# Patient Record
Sex: MALE | Race: WHITE
[De-identification: names, ages, dates, MRNs, and addresses within clinical notes are randomized per-mention and may not be internally consistent; named-entity substitution may affect disease eponyms.]

---

## 2019-08-15 ENCOUNTER — HOSPITAL ENCOUNTER (INPATIENT)
Dept: HOSPITAL 61 - ER | Age: 54
LOS: 2 days | Discharge: HOME | DRG: 246 | End: 2019-08-17
Attending: INTERNAL MEDICINE | Admitting: INTERNAL MEDICINE
Payer: OTHER GOVERNMENT

## 2019-08-15 VITALS — SYSTOLIC BLOOD PRESSURE: 135 MMHG | DIASTOLIC BLOOD PRESSURE: 86 MMHG

## 2019-08-15 VITALS — BODY MASS INDEX: 33.86 KG/M2 | WEIGHT: 250 LBS | HEIGHT: 72 IN

## 2019-08-15 VITALS — DIASTOLIC BLOOD PRESSURE: 63 MMHG | SYSTOLIC BLOOD PRESSURE: 118 MMHG

## 2019-08-15 VITALS — DIASTOLIC BLOOD PRESSURE: 103 MMHG | SYSTOLIC BLOOD PRESSURE: 186 MMHG

## 2019-08-15 VITALS — SYSTOLIC BLOOD PRESSURE: 190 MMHG | DIASTOLIC BLOOD PRESSURE: 113 MMHG

## 2019-08-15 DIAGNOSIS — I21.4: ICD-10-CM
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
## 2019-08-15 DIAGNOSIS — E11.65: ICD-10-CM

## 2019-08-15 DIAGNOSIS — E87.6: ICD-10-CM
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
## 2019-08-15 DIAGNOSIS — I25.10: ICD-10-CM

## 2019-08-15 DIAGNOSIS — Z82.49: ICD-10-CM

## 2019-08-15 DIAGNOSIS — I50.33: ICD-10-CM

## 2019-08-15 DIAGNOSIS — Z95.5: ICD-10-CM

## 2019-08-15 DIAGNOSIS — Z87.891: ICD-10-CM

## 2019-08-15 DIAGNOSIS — I11.0: ICD-10-CM

## 2019-08-15 DIAGNOSIS — E78.5: ICD-10-CM

## 2019-08-15 DIAGNOSIS — E66.9: ICD-10-CM

## 2019-08-15 DIAGNOSIS — T82.855A: Primary | ICD-10-CM

## 2019-08-15 LAB
ALBUMIN SERPL-MCNC: 3.9 G/DL (ref 3.4–5)
ALBUMIN/GLOB SERPL: 1.1 {RATIO} (ref 1–1.7)
ALP SERPL-CCNC: 117 U/L (ref 46–116)
ALT SERPL-CCNC: 46 U/L (ref 16–63)
ANION GAP SERPL CALC-SCNC: 13 MMOL/L (ref 6–14)
APTT PPP: YELLOW S
AST SERPL-CCNC: 21 U/L (ref 15–37)
BACTERIA #/AREA URNS HPF: 0 /HPF
BASOPHILS # BLD AUTO: 0 X10^3/UL (ref 0–0.2)
BASOPHILS NFR BLD: 0 % (ref 0–3)
BILIRUB SERPL-MCNC: 1.2 MG/DL (ref 0.2–1)
BILIRUB UR QL STRIP: NEGATIVE
BUN SERPL-MCNC: 16 MG/DL (ref 8–26)
BUN/CREAT SERPL: 12 (ref 6–20)
CALCIUM SERPL-MCNC: 9.1 MG/DL (ref 8.5–10.1)
CHLORIDE SERPL-SCNC: 99 MMOL/L (ref 98–107)
CO2 SERPL-SCNC: 26 MMOL/L (ref 21–32)
CREAT SERPL-MCNC: 1.3 MG/DL (ref 0.7–1.3)
EOSINOPHIL NFR BLD: 0.1 X10^3/UL (ref 0–0.7)
EOSINOPHIL NFR BLD: 1 % (ref 0–3)
ERYTHROCYTE [DISTWIDTH] IN BLOOD BY AUTOMATED COUNT: 13.7 % (ref 11.5–14.5)
FIBRINOGEN PPP-MCNC: CLEAR MG/DL
GFR SERPLBLD BASED ON 1.73 SQ M-ARVRAT: 57.5 ML/MIN
GLOBULIN SER-MCNC: 3.5 G/DL (ref 2.2–3.8)
GLUCOSE SERPL-MCNC: 530 MG/DL (ref 70–99)
HCT VFR BLD CALC: 44.9 % (ref 39–53)
HGB BLD-MCNC: 16 G/DL (ref 13–17.5)
LYMPHOCYTES # BLD: 0.6 X10^3/UL (ref 1–4.8)
LYMPHOCYTES NFR BLD AUTO: 12 % (ref 24–48)
MAGNESIUM SERPL-MCNC: 1.6 MG/DL (ref 1.8–2.4)
MCH RBC QN AUTO: 31 PG (ref 25–35)
MCHC RBC AUTO-ENTMCNC: 36 G/DL (ref 31–37)
MCV RBC AUTO: 87 FL (ref 79–100)
MONO #: 0.3 X10^3/UL (ref 0–1.1)
MONOCYTES NFR BLD: 6 % (ref 0–9)
NEUT #: 4.2 X10^3/UL (ref 1.8–7.7)
NEUTROPHILS NFR BLD AUTO: 81 % (ref 31–73)
NITRITE UR QL STRIP: NEGATIVE
PH UR STRIP: 6 [PH]
PLATELET # BLD AUTO: 179 X10^3/UL (ref 140–400)
POTASSIUM SERPL-SCNC: 3.9 MMOL/L (ref 3.5–5.1)
PROT SERPL-MCNC: 7.4 G/DL (ref 6.4–8.2)
PROT UR STRIP-MCNC: NEGATIVE MG/DL
PROTHROMBIN TIME: 12.3 SEC (ref 11.7–14)
RBC # BLD AUTO: 5.18 X10^6/UL (ref 4.3–5.7)
RBC #/AREA URNS HPF: 0 /HPF (ref 0–2)
SODIUM SERPL-SCNC: 138 MMOL/L (ref 136–145)
SQUAMOUS #/AREA URNS LPF: (no result) /LPF
UROBILINOGEN UR-MCNC: 0.2 MG/DL
WBC # BLD AUTO: 5.2 X10^3/UL (ref 4–11)
WBC #/AREA URNS HPF: 0 /HPF (ref 0–4)

## 2019-08-15 PROCEDURE — 99152 MOD SED SAME PHYS/QHP 5/>YRS: CPT

## 2019-08-15 PROCEDURE — C1887 CATHETER, GUIDING: HCPCS

## 2019-08-15 PROCEDURE — 83880 ASSAY OF NATRIURETIC PEPTIDE: CPT

## 2019-08-15 PROCEDURE — 36415 COLL VENOUS BLD VENIPUNCTURE: CPT

## 2019-08-15 PROCEDURE — 85610 PROTHROMBIN TIME: CPT

## 2019-08-15 PROCEDURE — 80053 COMPREHEN METABOLIC PANEL: CPT

## 2019-08-15 PROCEDURE — 71045 X-RAY EXAM CHEST 1 VIEW: CPT

## 2019-08-15 PROCEDURE — 84443 ASSAY THYROID STIM HORMONE: CPT

## 2019-08-15 PROCEDURE — 80061 LIPID PANEL: CPT

## 2019-08-15 PROCEDURE — 84484 ASSAY OF TROPONIN QUANT: CPT

## 2019-08-15 PROCEDURE — 92928 PRQ TCAT PLMT NTRAC ST 1 LES: CPT

## 2019-08-15 PROCEDURE — 85025 COMPLETE CBC W/AUTO DIFF WBC: CPT

## 2019-08-15 PROCEDURE — C1892 INTRO/SHEATH,FIXED,PEEL-AWAY: HCPCS

## 2019-08-15 PROCEDURE — G0378 HOSPITAL OBSERVATION PER HR: HCPCS

## 2019-08-15 PROCEDURE — C1725 CATH, TRANSLUMIN NON-LASER: HCPCS

## 2019-08-15 PROCEDURE — 81001 URINALYSIS AUTO W/SCOPE: CPT

## 2019-08-15 PROCEDURE — 96365 THER/PROPH/DIAG IV INF INIT: CPT

## 2019-08-15 PROCEDURE — C1769 GUIDE WIRE: HCPCS

## 2019-08-15 PROCEDURE — 83735 ASSAY OF MAGNESIUM: CPT

## 2019-08-15 PROCEDURE — 82962 GLUCOSE BLOOD TEST: CPT

## 2019-08-15 PROCEDURE — 96375 TX/PRO/DX INJ NEW DRUG ADDON: CPT

## 2019-08-15 PROCEDURE — 93005 ELECTROCARDIOGRAM TRACING: CPT

## 2019-08-15 PROCEDURE — 93306 TTE W/DOPPLER COMPLETE: CPT

## 2019-08-15 PROCEDURE — 93458 L HRT ARTERY/VENTRICLE ANGIO: CPT

## 2019-08-15 PROCEDURE — 85520 HEPARIN ASSAY: CPT

## 2019-08-15 PROCEDURE — 99153 MOD SED SAME PHYS/QHP EA: CPT

## 2019-08-15 PROCEDURE — C1713 ANCHOR/SCREW BN/BN,TIS/BN: HCPCS

## 2019-08-15 PROCEDURE — 83036 HEMOGLOBIN GLYCOSYLATED A1C: CPT

## 2019-08-15 PROCEDURE — 80048 BASIC METABOLIC PNL TOTAL CA: CPT

## 2019-08-15 RX ADMIN — ASPIRIN 81 MG SCH MG: 81 TABLET ORAL at 18:29

## 2019-08-15 RX ADMIN — INSULIN LISPRO SCH UNITS: 100 INJECTION, SOLUTION INTRAVENOUS; SUBCUTANEOUS at 18:34

## 2019-08-15 RX ADMIN — CARVEDILOL SCH MG: 12.5 TABLET, FILM COATED ORAL at 18:29

## 2019-08-15 RX ADMIN — INSULIN LISPRO SCH UNITS: 100 INJECTION, SOLUTION INTRAVENOUS; SUBCUTANEOUS at 17:34

## 2019-08-15 RX ADMIN — ATORVASTATIN CALCIUM SCH MG: 40 TABLET, FILM COATED ORAL at 20:10

## 2019-08-15 RX ADMIN — INSULIN LISPRO SCH UNITS: 100 INJECTION, SOLUTION INTRAVENOUS; SUBCUTANEOUS at 14:29

## 2019-08-15 RX ADMIN — HEPARIN SODIUM PRN UNIT: 1000 INJECTION, SOLUTION INTRAVENOUS; SUBCUTANEOUS at 23:28

## 2019-08-15 RX ADMIN — INSULIN GLARGINE SCH UNITS: 100 INJECTION, SOLUTION SUBCUTANEOUS at 20:20

## 2019-08-15 NOTE — EKG
[facility]

               [address] [place] [address]

Test Date:    2019-08-15               Test Time:    19:13:10

Pat Name:     ALESIA BOOGIE           Department:   

Patient ID:   [id number]           Room:         210 1

Gender:       M                        Technician:   

:          1965               Requested By: RAFIA HATFIELD

Order Number: [id number]           Reading MD:     

                                 Measurements

Intervals                              Axis          

Rate:         3                        P:            0

NY:           0                        QRS:          0

QRSD:         0                        T:            0

QT:           0                                      

QTc:          0                                      

                           Interpretive Statements

UNUSABLE ECG

RI6.02

No previous ECG available for comparison

## 2019-08-15 NOTE — EKG
[facility]

              [address] [place] [address]

Test Date:    2019-08-15               Test Time:    09:23:58

Pat Name:     ALESIA BOOGIE           Department:   

Patient ID:   [id number]           Room:          

Gender:       M                        Technician:   

:          1965               Requested By: CLAUDIA ROJAS

Order Number: [id number]           Reading MD:     

                                 Measurements

Intervals                              Axis          

Rate:         102                      P:            28

WI:           190                      QRS:          59

QRSD:         100                      T:            79

QT:           326                                    

QTc:          428                                    

                           Interpretive Statements

SINUS TACHYCARDIA

ST & T ABNORMALITY, CONSIDER RECENT

INFERIOR MYOCARDIAL OR PERICARDIAL DAMAGE

NON SPECIFIC ST DEPRESSION

ABNORMAL ECG

No previous ECG available for comparison

## 2019-08-15 NOTE — EKG
[facility]

              [address] [place] [address]

Test Date:    2019-08-15               Test Time:    09:45:08

Pat Name:     ALESIA BOOGIE           Department:   

Patient ID:   [id number]           Room:         210 1

Gender:       M                        Technician:   

:          1965               Requested By: CLAUDIA ROJAS

Order Number: [id number]           Reading MD:     

                                 Measurements

Intervals                              Axis          

Rate:         92                       P:            24

MN:           192                      QRS:          45

QRSD:         102                      T:            9

QT:           340                                    

QTc:          425                                    

                           Interpretive Statements

SINUS RHYTHM

QRS(T) CONTOUR ABNORMALITY

CONSIDER ANTEROSEPTAL MYOCARDIAL DAMAGE

POSSIBLY ABNORMAL ECG

RI6.01          Unconfirmed report

No previous ECG available for comparison

## 2019-08-15 NOTE — HP
ADMIT DATE:  08/15/2019



CHIEF COMPLAINT:  Chest pain.



HISTORY OF PRESENT ILLNESS:  The patient is a pleasant 54-year-old male, who

arrived at work today and had chest pain.  He has known coronary artery disease

with the previous 2 stents.  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

teaches GED education.  Described his pain as agonizing, rated 10/10 has

associated nausea.  They gave him some medicines.  When the ambulance arrived

that seemed to help a little.  I discussed the case with ER physician.  We are

going to admit the patient.  The patient is currently on a nitro drip.  We will

be consulting Cardiology for a full cardiac workup.



PAST MEDICAL HISTORY:  CAD with 2 previous stents, diabetes, hypertension and

tobacco abuse, but he quit a year ago.



ALLERGIES:  None.



FAMILY HISTORY:  Coronary artery disease.



SOCIAL HISTORY:  Works at the shelter as a teacher, he teaches GED.  He quit

smoking.  He says he is , but I do not think he is .  No drink, no

drugs.



MEDICATIONS:  Reviewed, please refer to the MRAD.



REVIEW OF SYSTEMS:

GENERAL:  No history of weight change, weakness or fevers.

SKIN:  No bruising, hair changes or rashes.

EYES:  No blurred, double or loss of vision.

NOSE AND THROAT:  No history of nosebleeds, hoarseness or sore throat.

HEART:  He complains of chest pain.

LUNGS:  Denies cough, hemoptysis, wheezing or shortness of breath.

GASTROINTESTINAL:  Denies changes in appetite, nausea, vomiting, diarrhea or

constipation.

GENITOURINARY:  No history of frequency, urgency, hesitancy or nocturia.

NEUROLOGIC:  Denies history of numbness, tingling, tremor or weakness.

PSYCHIATRIC:  No history of panic, anxiety or depression.

ENDOCRINE:  No history of heat or cold intolerance, polyuria or polydipsia.

EXTREMITIES:  Denies muscle weakness, joint pain, pain on walking or stiffness.



PHYSICAL EXAMINATION:

VITALS:  Within normal limits and are stable.

GENERAL:  No apparent distress.  Alert and oriented.

HEENT:  Head is normocephalic, atraumatic, pupils were equally round and

reactive to light and accommodation.

NECK:  Supple, no JVD, no thyromegaly was noted.

LUNGS:  Clear to auscultation in all lung fields without rhonchi or wheezing.

HEART:  RRR, S1, S2 present.  Peripheral pulses intact, no obvious murmurs were

noted.

ABDOMEN:  Soft, nontender.  Positive bowel sounds, no organomegaly, normal bowel

sounds.

EXTREMITIES:  Without any cyanosis, clubbing, or edema.  Pedal pulses intact,

Homans sign is negative.

NEUROLOGIC:  Normal speech, normal tone.  A and O x 3, moves all extremities, no

obvious focal deficits.

PSYCHIATRIC:  Normal affect, normal mood.  Stable.

SKIN:  No ulcerations or rashes, good skin turgor, no jaundice.

VASCULAR:  Good capillary refill, neurovascular bundle appears to be intact.



LABORATORY DATA:  Troponin is 0.  Glucose is 500.



ASSESSMENT AND PLAN:  Chest pain and hyperglycemia.  The patient has been

admitted.  We will start diabetic protocol, serial enzymes, serial EKGs, nitro

drip, consult Cardiology.  Suspect he might need a cardiac catheterization, but

we will await Cardiology input.



PROGNOSIS:  Guarded.

 



______________________________

LEAH NEUMANN DO



DR:  RONALD/[email or web address]  JOB#:  [id number] / [id number]

DD:  08/15/2019 16:24  DT:  08/15/2019 18:15

## 2019-08-15 NOTE — PDOC2
CONSULT


Date of Consult


Date of Consult


DATE: 8/15/19 


TIME: 16:06





Reason for Consult


Reason for Consult:


Chest pain





Referring Physician


Referring Physician:


Dr. Patel





Identification/Chief Complaint


Chief Complaint


Chest pain





Source


Source:  Chart review, Patient





History of Present Illness


Reason for Visit:


55 y/o male with history of CAD s/p PCI/stents 8 years ago usually followed at 

[facility] presented with retrosternal CP that started at 8 am today.  He described it

as sharp in nature associated with diaphoresis and nausea.  It was 8/10 severity

at onset and currently at 2/10.  He denied any orthopnea/PND, palpitations or 

syncope.





Past Medical History


Cardiovascular:  CAD, HTN, Hyperlipidemia


Endocrine:  Diabetes





Past Surgical History


Past Surgical History:  No pertinent history





Social History


Quit


ALCOHOL:  none


Drugs:  None





Current Problem List


Problem List


Problems


Medical Problems:


(1) CAD (coronary artery disease)


Status: Acute  





(2) Chest pain


Status: Acute  





(3) Hyperglycemia


Status: Acute  











Current Medications


Current Medications





Current Medications


Metoprolol Tartrate (Lopressor Vial) 5 mg 1X  ONCE IVP  Last administered on 

8/15/19at 09:46;  Start 8/15/19 at 09:30;  Stop 8/15/19 at 09:32;  Status DC


Heparin Sodium (Porcine) (Heparin Sodium) 4,000 unit 1X  ONCE IV  Last 

administered on 8/15/19at 09:49;  Start 8/15/19 at 09:45;  Stop 8/15/19 at 

09:55;  Status DC


Heparin Sodium/ Dextrose 0 ml @ As Directed STK-MED ONCE IV ;  Start 8/15/19 at 

09:45;  Stop 8/15/19 at 09:45;  Status DC


Lorazepam (Ativan Inj) 2 mg STK-MED ONCE .ROUTE ;  Start 8/15/19 at 09:45;  Stop

8/15/19 at 09:45;  Status DC


Nitroglycerin/ Dextrose 250 ml @  As Directed STK-MED ONCE IV ;  Start 8/15/19 

at 09:51;  Stop 8/15/19 at 09:51;  Status DC


Sodium Chloride 1,000 ml @  1,000 mls/hr 1X  ONCE IV  Last administered on 

8/15/19at 10:34;  Start 8/15/19 at 10:15;  Stop 8/15/19 at 11:14;  Status DC


Insulin Human Regular (HumuLIN R VIAL) 10 unit 1X  ONCE IV  Last administered on

8/15/19at 10:34;  Start 8/15/19 at 10:15;  Stop 8/15/19 at 10:18;  Status DC


Magnesium Sulfate/ Dextrose 100 ml @  100 mls/hr 1X  ONCE IV  Last administered 

on 8/15/19at 10:34;  Start 8/15/19 at 10:15;  Stop 8/15/19 at 11:14;  Status DC


Insulin Human Lispro (HumaLOG) 10 units 1X  ONCE SQ  Last administered on 

8/15/19at 14:30;  Start 8/15/19 at 13:45;  Stop 8/15/19 at 13:49;  Status DC


Insulin Human Lispro (HumaLOG) 0-9 UNITS TIDWMEALS SQ  Last administered on 

8/15/19at 14:30;  Start 8/15/19 at 14:25


Dextrose (Dextrose 50%-Water Syringe) 12.5 gm PRN Q15MIN  PRN IV SEE COMMENTS;  

Start 8/15/19 at 13:45;  Status UNV


Dextrose 250 ml PRN Q15MIN  PRN IV SEE COMMENTS;  Start 8/15/19 at 13:45


Heparin Sodium/ Dextrose 500 ml @  20 mls/hr CONT  PRN IV chest pain Last 

administered on 8/15/19at 15:08;  Start 8/15/19 at 14:30


Heparin Sodium (Porcine) (Heparin Sodium) 2,700 unit PRN Q6HRS  PRN IV FOR UFH 

LEVEL LESS THAN 0.2;  Start 8/15/19 at 14:30


Labetalol HCl (Normodyne Iv Push) 20 mg PRN Q2HR  PRN IVP HYPERTENSION Last 

administered on 8/15/19at 15:32;  Start 8/15/19 at 14:30


Amlodipine Besylate (Norvasc) 5 mg 1X  ONCE PO  Last administered on 8/15/19at 

15:32;  Start 8/15/19 at 14:30;  Stop 8/15/19 at 14:55;  Status DC


Tirofiban/Sodium Chloride 250 ml @ 0 mls/hr CONT  PRN IV PER PROTOCOL;  Start 

8/15/19 at 14:45





Allergies


Allergies:  


Coded Allergies:  


     No Known Drug Allergies (Unverified , 8/15/19)





ROS


PSYCHOLOGICAL ROS:  No: Hallucinations


Eyes:  No Loss of vision


HEENT:  No: Epistaxis


Respiratory:  No: Hemoptysis


Cardiovascular:  yes Chest Pain


Gastrointestinal:  Yes Nausea


Genitourinary:  No Hematuria


Neurological:  No Seizures


Skin:  No Rash





Physical Exam


General:  Alert, Oriented X3


HEENT:  Atraumatic, PERRLA


Lungs:  Clear to auscultation


Heart:  Regular rate


Abdomen:  Soft, No tenderness


Extremities:  No edema


Psych/Mental Status:  Mood NL





Vitals


VITALS





Vital Signs








  Date Time  Temp Pulse Resp B/P (MAP) Pulse Ox O2 Delivery O2 Flow Rate FiO2


 


8/15/19 15:32  95  190/113    


 


8/15/19 12:30      Room Air  


 


8/15/19 11:20   18  95   


 


8/15/19 09:20 98.6       





 98.6       











Labs


Labs





Laboratory Tests








Test


 8/15/19


09:24 8/15/19


09:45 8/15/19


11:11 8/15/19


12:27


 


White Blood Count


 5.2 x10^3/uL


(4.0-11.0) 


 


 





 


Red Blood Count


 5.18 x10^6/uL


(4.30-5.70) 


 


 





 


Hemoglobin


 16.0 g/dL


(13.0-17.5) 


 


 





 


Hematocrit


 44.9 %


(39.0-53.0) 


 


 





 


Mean Corpuscular Volume 87 fL ()    


 


Mean Corpuscular Hemoglobin 31 pg (25-35)    


 


Mean Corpuscular Hemoglobin


Concent 36 g/dL


(31-37) 


 


 





 


Red Cell Distribution Width


 13.7 %


(11.5-14.5) 


 


 





 


Platelet Count


 179 x10^3/uL


(140-400) 


 


 





 


Neutrophils (%) (Auto) 81 % (31-73)    


 


Lymphocytes (%) (Auto) 12 % (24-48)    


 


Monocytes (%) (Auto) 6 % (0-9)    


 


Eosinophils (%) (Auto) 1 % (0-3)    


 


Basophils (%) (Auto) 0 % (0-3)    


 


Neutrophils # (Auto)


 4.2 x10^3/uL


(1.8-7.7) 


 


 





 


Lymphocytes # (Auto)


 0.6 x10^3/uL


(1.0-4.8) 


 


 





 


Monocytes # (Auto)


 0.3 x10^3/uL


(0.0-1.1) 


 


 





 


Eosinophils # (Auto)


 0.1 x10^3/uL


(0.0-0.7) 


 


 





 


Basophils # (Auto)


 0.0 x10^3/uL


(0.0-0.2) 


 


 





 


Prothrombin Time


 12.3 SEC


(11.7-14.0) 


 


 





 


Prothromb Time International


Ratio 0.9 (0.8-1.1) 


 


 


 





 


Sodium Level


 138 mmol/L


(136-145) 


 


 





 


Potassium Level


 3.9 mmol/L


(3.5-5.1) 


 


 





 


Chloride Level


 99 mmol/L


() 


 


 





 


Carbon Dioxide Level


 26 mmol/L


(21-32) 


 


 





 


Anion Gap 13 (6-14)    


 


Blood Urea Nitrogen


 16 mg/dL


(8-26) 


 


 





 


Creatinine


 1.3 mg/dL


(0.7-1.3) 


 


 





 


Estimated GFR


(Cockcroft-Gault) 57.5 


 


 


 





 


BUN/Creatinine Ratio 12 (6-20)    


 


Glucose Level


 530 mg/dL


(70-99) 


 


 





 


Calcium Level


 9.1 mg/dL


(8.5-10.1) 


 


 





 


Magnesium Level


 1.6 mg/dL


(1.8-2.4) 


 


 





 


Total Bilirubin


 1.2 mg/dL


(0.2-1.0) 


 


 





 


Aspartate Amino Transf


(AST/SGOT) 21 U/L (15-37) 


 


 


 





 


Alanine Aminotransferase


(ALT/SGPT) 46 U/L (16-63) 


 


 


 





 


Alkaline Phosphatase


 117 U/L


() 


 


 





 


Troponin I Quantitative


 < 0.017 ng/mL


(0.000-0.055) 


 


 





 


NT-Pro-B-Type Natriuretic


Peptide 36 pg/mL


(0-124) 


 


 





 


Total Protein


 7.4 g/dL


(6.4-8.2) 


 


 





 


Albumin


 3.9 g/dL


(3.4-5.0) 


 


 





 


Albumin/Globulin Ratio 1.1 (1.0-1.7)    


 


Thyroid Stimulating Hormone


(TSH) 2.539 uIU/mL


(0.358-3.74) 


 


 





 


Bedside Troponin I


 


 0.00 ng/ml


(<0.08) 0.23 ng/ml


(<0.08) 





 


Glucose (Fingerstick)


 


 


 


 344 mg/dL


(70-99)


 


Test


 8/15/19


13:35 8/15/19


15:00 


 





 


Troponin I Quantitative


 9.492 ng/mL


(0.000-0.055) 


 


 





 


Urine Collection Type  Unknown   


 


Urine Color  Yellow   


 


Urine Clarity  Clear   


 


Urine pH  6.0   


 


Urine Specific Gravity  >=1.030   


 


Urine Protein


 


 Negative mg/dL


(NEG-TRACE) 


 





 


Urine Glucose (UA)


 


 >=1000 mg/dL


(NEG) 


 





 


Urine Ketones (Stick)  15 mg/dL (NEG)   


 


Urine Blood  Negative (NEG)   


 


Urine Nitrite  Negative (NEG)   


 


Urine Bilirubin  Negative (NEG)   


 


Urine Urobilinogen Dipstick


 


 0.2 mg/dL (0.2


mg/dL) 


 





 


Urine Leukocyte Esterase  Negative (NEG)   


 


Urine RBC  0 /HPF (0-2)   


 


Urine WBC  0 /HPF (0-4)   


 


Urine Squamous Epithelial


Cells 


 Occ /LPF 


 


 





 


Urine Bacteria  0 /HPF (0-FEW)   








Laboratory Tests








Test


 8/15/19


09:24 8/15/19


09:45 8/15/19


11:11 8/15/19


12:27


 


White Blood Count


 5.2 x10^3/uL


(4.0-11.0) 


 


 





 


Red Blood Count


 5.18 x10^6/uL


(4.30-5.70) 


 


 





 


Hemoglobin


 16.0 g/dL


(13.0-17.5) 


 


 





 


Hematocrit


 44.9 %


(39.0-53.0) 


 


 





 


Mean Corpuscular Volume 87 fL ()    


 


Mean Corpuscular Hemoglobin 31 pg (25-35)    


 


Mean Corpuscular Hemoglobin


Concent 36 g/dL


(31-37) 


 


 





 


Red Cell Distribution Width


 13.7 %


(11.5-14.5) 


 


 





 


Platelet Count


 179 x10^3/uL


(140-400) 


 


 





 


Neutrophils (%) (Auto) 81 % (31-73)    


 


Lymphocytes (%) (Auto) 12 % (24-48)    


 


Monocytes (%) (Auto) 6 % (0-9)    


 


Eosinophils (%) (Auto) 1 % (0-3)    


 


Basophils (%) (Auto) 0 % (0-3)    


 


Neutrophils # (Auto)


 4.2 x10^3/uL


(1.8-7.7) 


 


 





 


Lymphocytes # (Auto)


 0.6 x10^3/uL


(1.0-4.8) 


 


 





 


Monocytes # (Auto)


 0.3 x10^3/uL


(0.0-1.1) 


 


 





 


Eosinophils # (Auto)


 0.1 x10^3/uL


(0.0-0.7) 


 


 





 


Basophils # (Auto)


 0.0 x10^3/uL


(0.0-0.2) 


 


 





 


Prothrombin Time


 12.3 SEC


(11.7-14.0) 


 


 





 


Prothromb Time International


Ratio 0.9 (0.8-1.1) 


 


 


 





 


Sodium Level


 138 mmol/L


(136-145) 


 


 





 


Potassium Level


 3.9 mmol/L


(3.5-5.1) 


 


 





 


Chloride Level


 99 mmol/L


() 


 


 





 


Carbon Dioxide Level


 26 mmol/L


(21-32) 


 


 





 


Anion Gap 13 (6-14)    


 


Blood Urea Nitrogen


 16 mg/dL


(8-26) 


 


 





 


Creatinine


 1.3 mg/dL


(0.7-1.3) 


 


 





 


Estimated GFR


(Cockcroft-Gault) 57.5 


 


 


 





 


BUN/Creatinine Ratio 12 (6-20)    


 


Glucose Level


 530 mg/dL


(70-99) 


 


 





 


Calcium Level


 9.1 mg/dL


(8.5-10.1) 


 


 





 


Magnesium Level


 1.6 mg/dL


(1.8-2.4) 


 


 





 


Total Bilirubin


 1.2 mg/dL


(0.2-1.0) 


 


 





 


Aspartate Amino Transf


(AST/SGOT) 21 U/L (15-37) 


 


 


 





 


Alanine Aminotransferase


(ALT/SGPT) 46 U/L (16-63) 


 


 


 





 


Alkaline Phosphatase


 117 U/L


() 


 


 





 


Troponin I Quantitative


 < 0.017 ng/mL


(0.000-0.055) 


 


 





 


NT-Pro-B-Type Natriuretic


Peptide 36 pg/mL


(0-124) 


 


 





 


Total Protein


 7.4 g/dL


(6.4-8.2) 


 


 





 


Albumin


 3.9 g/dL


(3.4-5.0) 


 


 





 


Albumin/Globulin Ratio 1.1 (1.0-1.7)    


 


Thyroid Stimulating Hormone


(TSH) 2.539 uIU/mL


(0.358-3.74) 


 


 





 


Bedside Troponin I


 


 0.00 ng/ml


(<0.08) 0.23 ng/ml


(<0.08) 





 


Glucose (Fingerstick)


 


 


 


 344 mg/dL


(70-99)


 


Test


 8/15/19


13:35 8/15/19


15:00 


 





 


Troponin I Quantitative


 9.492 ng/mL


(0.000-0.055) 


 


 





 


Urine Collection Type  Unknown   


 


Urine Color  Yellow   


 


Urine Clarity  Clear   


 


Urine pH  6.0   


 


Urine Specific Gravity  >=1.030   


 


Urine Protein


 


 Negative mg/dL


(NEG-TRACE) 


 





 


Urine Glucose (UA)


 


 >=1000 mg/dL


(NEG) 


 





 


Urine Ketones (Stick)  15 mg/dL (NEG)   


 


Urine Blood  Negative (NEG)   


 


Urine Nitrite  Negative (NEG)   


 


Urine Bilirubin  Negative (NEG)   


 


Urine Urobilinogen Dipstick


 


 0.2 mg/dL (0.2


mg/dL) 


 





 


Urine Leukocyte Esterase  Negative (NEG)   


 


Urine RBC  0 /HPF (0-2)   


 


Urine WBC  0 /HPF (0-4)   


 


Urine Squamous Epithelial


Cells 


 Occ /LPF 


 


 





 


Urine Bacteria  0 /HPF (0-FEW)   











Assessment/Plan


Assessment/Plan


1.  Chest pain with mixed features.  EKG showed SR with suspicious changes but 

not diagnostic for ACS. Patient stated that his CP has almost completely 

resolved and recent stress test at [facility] 2-3 weeks ago apparently was normal.  

Initial trop negative.  Plan for serial enzymes, 2D echo to rule out WMA and 

plan ischemic eval based on results.  Continue ASA and start heparin


2.  Accelerated HTN: resume home meds and titrate for better control


3.  DM-2: treat per IM. Consider jardiance.


4.  HLP: statins


Thank you for your consultation











SHIRA SAGE MD           Aug 15, 2019 16:07

## 2019-08-16 VITALS — DIASTOLIC BLOOD PRESSURE: 89 MMHG | SYSTOLIC BLOOD PRESSURE: 144 MMHG

## 2019-08-16 VITALS — SYSTOLIC BLOOD PRESSURE: 131 MMHG | DIASTOLIC BLOOD PRESSURE: 85 MMHG

## 2019-08-16 VITALS — DIASTOLIC BLOOD PRESSURE: 72 MMHG | SYSTOLIC BLOOD PRESSURE: 115 MMHG

## 2019-08-16 VITALS — SYSTOLIC BLOOD PRESSURE: 134 MMHG | DIASTOLIC BLOOD PRESSURE: 79 MMHG

## 2019-08-16 VITALS — DIASTOLIC BLOOD PRESSURE: 89 MMHG | SYSTOLIC BLOOD PRESSURE: 134 MMHG

## 2019-08-16 VITALS — DIASTOLIC BLOOD PRESSURE: 69 MMHG | SYSTOLIC BLOOD PRESSURE: 115 MMHG

## 2019-08-16 VITALS — SYSTOLIC BLOOD PRESSURE: 144 MMHG | DIASTOLIC BLOOD PRESSURE: 81 MMHG

## 2019-08-16 VITALS — SYSTOLIC BLOOD PRESSURE: 142 MMHG | DIASTOLIC BLOOD PRESSURE: 92 MMHG

## 2019-08-16 VITALS — DIASTOLIC BLOOD PRESSURE: 89 MMHG | SYSTOLIC BLOOD PRESSURE: 126 MMHG

## 2019-08-16 VITALS — DIASTOLIC BLOOD PRESSURE: 81 MMHG | SYSTOLIC BLOOD PRESSURE: 142 MMHG

## 2019-08-16 VITALS — SYSTOLIC BLOOD PRESSURE: 122 MMHG | DIASTOLIC BLOOD PRESSURE: 79 MMHG

## 2019-08-16 VITALS — DIASTOLIC BLOOD PRESSURE: 70 MMHG | SYSTOLIC BLOOD PRESSURE: 111 MMHG

## 2019-08-16 VITALS — DIASTOLIC BLOOD PRESSURE: 94 MMHG | SYSTOLIC BLOOD PRESSURE: 141 MMHG

## 2019-08-16 LAB
ANION GAP SERPL CALC-SCNC: 11 MMOL/L (ref 6–14)
BASOPHILS # BLD AUTO: 0 X10^3/UL (ref 0–0.2)
BASOPHILS NFR BLD: 0 % (ref 0–3)
BUN SERPL-MCNC: 16 MG/DL (ref 8–26)
CALCIUM SERPL-MCNC: 8.4 MG/DL (ref 8.5–10.1)
CHLORIDE SERPL-SCNC: 106 MMOL/L (ref 98–107)
CHOLEST SERPL-MCNC: 165 MG/DL (ref 0–200)
CHOLEST/HDLC SERPL: 5.2 {RATIO}
CO2 SERPL-SCNC: 26 MMOL/L (ref 21–32)
CREAT SERPL-MCNC: 1.1 MG/DL (ref 0.7–1.3)
EOSINOPHIL NFR BLD: 0.1 X10^3/UL (ref 0–0.7)
EOSINOPHIL NFR BLD: 1 % (ref 0–3)
ERYTHROCYTE [DISTWIDTH] IN BLOOD BY AUTOMATED COUNT: 13.9 % (ref 11.5–14.5)
GFR SERPLBLD BASED ON 1.73 SQ M-ARVRAT: 69.8 ML/MIN
GLUCOSE SERPL-MCNC: 175 MG/DL (ref 70–99)
HBA1C MFR BLD: 10.1 % (ref 4.8–5.6)
HCT VFR BLD CALC: 40.5 % (ref 39–53)
HDLC SERPL-MCNC: 32 MG/DL (ref 40–60)
HGB BLD-MCNC: 14.1 G/DL (ref 13–17.5)
LDLC: 95 MG/DL (ref 0–100)
LYMPHOCYTES # BLD: 1.2 X10^3/UL (ref 1–4.8)
LYMPHOCYTES NFR BLD AUTO: 16 % (ref 24–48)
MAGNESIUM SERPL-MCNC: 1.9 MG/DL (ref 1.8–2.4)
MCH RBC QN AUTO: 31 PG (ref 25–35)
MCHC RBC AUTO-ENTMCNC: 35 G/DL (ref 31–37)
MCV RBC AUTO: 88 FL (ref 79–100)
MONO #: 0.5 X10^3/UL (ref 0–1.1)
MONOCYTES NFR BLD: 7 % (ref 0–9)
NEUT #: 5.9 X10^3/UL (ref 1.8–7.7)
NEUTROPHILS NFR BLD AUTO: 76 % (ref 31–73)
PLATELET # BLD AUTO: 175 X10^3/UL (ref 140–400)
POTASSIUM SERPL-SCNC: 3.1 MMOL/L (ref 3.5–5.1)
RBC # BLD AUTO: 4.59 X10^6/UL (ref 4.3–5.7)
SODIUM SERPL-SCNC: 143 MMOL/L (ref 136–145)
TRIGL SERPL-MCNC: 189 MG/DL (ref 0–150)
VLDLC: 38 MG/DL (ref 0–40)
WBC # BLD AUTO: 7.8 X10^3/UL (ref 4–11)

## 2019-08-16 PROCEDURE — B2111ZZ FLUOROSCOPY OF MULTIPLE CORONARY ARTERIES USING LOW OSMOLAR CONTRAST: ICD-10-PCS | Performed by: INTERNAL MEDICINE

## 2019-08-16 PROCEDURE — 027135Z DILATION OF CORONARY ARTERY, TWO ARTERIES WITH TWO DRUG-ELUTING INTRALUMINAL DEVICES, PERCUTANEOUS APPROACH: ICD-10-PCS | Performed by: INTERNAL MEDICINE

## 2019-08-16 PROCEDURE — 4A023N7 MEASUREMENT OF CARDIAC SAMPLING AND PRESSURE, LEFT HEART, PERCUTANEOUS APPROACH: ICD-10-PCS | Performed by: INTERNAL MEDICINE

## 2019-08-16 RX ADMIN — TIROFIBAN PRN MLS/HR: 5 INJECTION, SOLUTION INTRAVENOUS at 15:00

## 2019-08-16 RX ADMIN — INSULIN GLARGINE SCH UNITS: 100 INJECTION, SOLUTION SUBCUTANEOUS at 22:04

## 2019-08-16 RX ADMIN — INSULIN LISPRO SCH UNITS: 100 INJECTION, SOLUTION INTRAVENOUS; SUBCUTANEOUS at 08:00

## 2019-08-16 RX ADMIN — ATORVASTATIN CALCIUM SCH MG: 40 TABLET, FILM COATED ORAL at 22:01

## 2019-08-16 RX ADMIN — INSULIN LISPRO SCH UNITS: 100 INJECTION, SOLUTION INTRAVENOUS; SUBCUTANEOUS at 17:00

## 2019-08-16 RX ADMIN — CARVEDILOL SCH MG: 12.5 TABLET, FILM COATED ORAL at 17:55

## 2019-08-16 RX ADMIN — ASPIRIN 81 MG SCH MG: 81 TABLET ORAL at 10:13

## 2019-08-16 RX ADMIN — HEPARIN SODIUM PRN UNIT: 1000 INJECTION, SOLUTION INTRAVENOUS; SUBCUTANEOUS at 06:16

## 2019-08-16 RX ADMIN — LOSARTAN POTASSIUM SCH MG: 50 TABLET ORAL at 09:00

## 2019-08-16 RX ADMIN — INSULIN LISPRO SCH UNITS: 100 INJECTION, SOLUTION INTRAVENOUS; SUBCUTANEOUS at 18:04

## 2019-08-16 RX ADMIN — INSULIN LISPRO SCH UNITS: 100 INJECTION, SOLUTION INTRAVENOUS; SUBCUTANEOUS at 12:00

## 2019-08-16 RX ADMIN — CARVEDILOL SCH MG: 12.5 TABLET, FILM COATED ORAL at 10:14

## 2019-08-16 NOTE — PDOC
PROGRESS NOTES


Chief Complaint


Chief Complaint


1.  Chest pain with mixed features.  POss NSTEMI - was on ASA at home only


2.  Accelerated HTN: 


3.  DM-2: jardiance


4.  HLP: statins


5. Obesity


6. Hypokalemia








History of Present Illness


History of Present Illness


HAs no complaints, HEparin gtt running


[facility] planned for later


NO CP,. VS ok


BS not bad


hgb a1c pending





PLAN:


[facility] later


CArdiac meds


HGba1c ff up


Dw RN and family at bedside





Vitals


Vitals





Vital Signs








  Date Time  Temp Pulse Resp B/P (MAP) Pulse Ox O2 Delivery O2 Flow Rate FiO2


 


8/16/19 10:14  68  126/69    


 


8/16/19 07:00 98.3  20  96 Room Air  





 98.3       











Physical Exam


General:  Alert, Oriented X3


Heart:  Regular rate


Abdomen:  Soft, No tenderness


Extremities:  No edema





Labs


LABS





Laboratory Tests








Test


 8/15/19


12:27 8/15/19


13:35 8/15/19


15:00 8/15/19


16:20


 


Glucose (Fingerstick)


 344 mg/dL


(70-99) 


 


 





 


Troponin I Quantitative


 


 9.492 ng/mL


(0.000-0.055) 


 29.259 ng/mL


(0.000-0.055)


 


Urine Collection Type   Unknown  


 


Urine Color   Yellow  


 


Urine Clarity   Clear  


 


Urine pH   6.0  


 


Urine Specific Gravity   >=1.030  


 


Urine Protein


 


 


 Negative mg/dL


(NEG-TRACE) 





 


Urine Glucose (UA)


 


 


 >=1000 mg/dL


(NEG) 





 


Urine Ketones (Stick)   15 mg/dL (NEG)  


 


Urine Blood   Negative (NEG)  


 


Urine Nitrite   Negative (NEG)  


 


Urine Bilirubin   Negative (NEG)  


 


Urine Urobilinogen Dipstick


 


 


 0.2 mg/dL (0.2


mg/dL) 





 


Urine Leukocyte Esterase   Negative (NEG)  


 


Urine RBC   0 /HPF (0-2)  


 


Urine WBC   0 /HPF (0-4)  


 


Urine Squamous Epithelial


Cells 


 


 Occ /LPF 


 





 


Urine Bacteria   0 /HPF (0-FEW)  


 


Test


 8/15/19


17:13 8/15/19


20:07 8/15/19


22:30 8/16/19


05:00


 


Glucose (Fingerstick)


 328 mg/dL


(70-99) 240 mg/dL


(70-99) 


 





 


Heparin Anti-Xa Act,


Unfractionated 


 


 < 0.10 IU/mL


(0.30-0.70) 0.19 IU/mL


(0.30-0.70)


 


White Blood Count


 


 


 


 7.8 x10^3/uL


(4.0-11.0)


 


Red Blood Count


 


 


 


 4.59 x10^6/uL


(4.30-5.70)


 


Hemoglobin


 


 


 


 14.1 g/dL


(13.0-17.5)


 


Hematocrit


 


 


 


 40.5 %


(39.0-53.0)


 


Mean Corpuscular Volume    88 fL () 


 


Mean Corpuscular Hemoglobin    31 pg (25-35) 


 


Mean Corpuscular Hemoglobin


Concent 


 


 


 35 g/dL


(31-37)


 


Red Cell Distribution Width


 


 


 


 13.9 %


(11.5-14.5)


 


Platelet Count


 


 


 


 175 x10^3/uL


(140-400)


 


Neutrophils (%) (Auto)    76 % (31-73) 


 


Lymphocytes (%) (Auto)    16 % (24-48) 


 


Monocytes (%) (Auto)    7 % (0-9) 


 


Eosinophils (%) (Auto)    1 % (0-3) 


 


Basophils (%) (Auto)    0 % (0-3) 


 


Neutrophils # (Auto)


 


 


 


 5.9 x10^3/uL


(1.8-7.7)


 


Lymphocytes # (Auto)


 


 


 


 1.2 x10^3/uL


(1.0-4.8)


 


Monocytes # (Auto)


 


 


 


 0.5 x10^3/uL


(0.0-1.1)


 


Eosinophils # (Auto)


 


 


 


 0.1 x10^3/uL


(0.0-0.7)


 


Basophils # (Auto)


 


 


 


 0.0 x10^3/uL


(0.0-0.2)


 


Sodium Level


 


 


 


 143 mmol/L


(136-145)


 


Potassium Level


 


 


 


 3.1 mmol/L


(3.5-5.1)


 


Chloride Level


 


 


 


 106 mmol/L


()


 


Carbon Dioxide Level


 


 


 


 26 mmol/L


(21-32)


 


Anion Gap    11 (6-14) 


 


Blood Urea Nitrogen


 


 


 


 16 mg/dL


(8-26)


 


Creatinine


 


 


 


 1.1 mg/dL


(0.7-1.3)


 


Estimated GFR


(Cockcroft-Gault) 


 


 


 69.8 





 


Glucose Level


 


 


 


 175 mg/dL


(70-99)


 


Calcium Level


 


 


 


 8.4 mg/dL


(8.5-10.1)


 


Magnesium Level


 


 


 


 1.9 mg/dL


(1.8-2.4)


 


Triglycerides Level


 


 


 


 189 mg/dL


(0-150)


 


Cholesterol Level


 


 


 


 165 mg/dL


(0-200)


 


LDL Cholesterol, Calculated


 


 


 


 95 mg/dL


(0-100)


 


VLDL Cholesterol, Calculated


 


 


 


 38 mg/dL


(0-40)


 


Non-HDL Cholesterol Calculated


 


 


 


 133 mg/dL


(0-129)


 


HDL Cholesterol


 


 


 


 32 mg/dL


(40-60)


 


Cholesterol/HDL Ratio    5.2 


 


Test


 8/16/19


07:50 


 


 





 


Glucose (Fingerstick)


 172 mg/dL


(70-99) 


 


 














Review of Systems


Review of Systems


no soa, no cp, no abd pain, no n.v. emesis, no headache, rest of 14 pt neg





Assessment and Plan


Assessmemt and Plan


Problems


Medical Problems:


(1) CAD (coronary artery disease)


Status: Acute  





(2) Chest pain


Status: Acute  





(3) Hyperglycemia


Status: Acute  











Comment


Review of Relevant


I have reviewed the following items sindy (where applicable) has been applied.


Labs





Laboratory Tests








Test


 8/15/19


09:24 8/15/19


09:45 8/15/19


11:11 8/15/19


12:27


 


White Blood Count


 5.2 x10^3/uL


(4.0-11.0) 


 


 





 


Red Blood Count


 5.18 x10^6/uL


(4.30-5.70) 


 


 





 


Hemoglobin


 16.0 g/dL


(13.0-17.5) 


 


 





 


Hematocrit


 44.9 %


(39.0-53.0) 


 


 





 


Mean Corpuscular Volume 87 fL ()    


 


Mean Corpuscular Hemoglobin 31 pg (25-35)    


 


Mean Corpuscular Hemoglobin


Concent 36 g/dL


(31-37) 


 


 





 


Red Cell Distribution Width


 13.7 %


(11.5-14.5) 


 


 





 


Platelet Count


 179 x10^3/uL


(140-400) 


 


 





 


Neutrophils (%) (Auto) 81 % (31-73)    


 


Lymphocytes (%) (Auto) 12 % (24-48)    


 


Monocytes (%) (Auto) 6 % (0-9)    


 


Eosinophils (%) (Auto) 1 % (0-3)    


 


Basophils (%) (Auto) 0 % (0-3)    


 


Neutrophils # (Auto)


 4.2 x10^3/uL


(1.8-7.7) 


 


 





 


Lymphocytes # (Auto)


 0.6 x10^3/uL


(1.0-4.8) 


 


 





 


Monocytes # (Auto)


 0.3 x10^3/uL


(0.0-1.1) 


 


 





 


Eosinophils # (Auto)


 0.1 x10^3/uL


(0.0-0.7) 


 


 





 


Basophils # (Auto)


 0.0 x10^3/uL


(0.0-0.2) 


 


 





 


Prothrombin Time


 12.3 SEC


(11.7-14.0) 


 


 





 


Prothromb Time International


Ratio 0.9 (0.8-1.1) 


 


 


 





 


Sodium Level


 138 mmol/L


(136-145) 


 


 





 


Potassium Level


 3.9 mmol/L


(3.5-5.1) 


 


 





 


Chloride Level


 99 mmol/L


() 


 


 





 


Carbon Dioxide Level


 26 mmol/L


(21-32) 


 


 





 


Anion Gap 13 (6-14)    


 


Blood Urea Nitrogen


 16 mg/dL


(8-26) 


 


 





 


Creatinine


 1.3 mg/dL


(0.7-1.3) 


 


 





 


Estimated GFR


(Cockcroft-Gault) 57.5 


 


 


 





 


BUN/Creatinine Ratio 12 (6-20)    


 


Glucose Level


 530 mg/dL


(70-99) 


 


 





 


Hemoglobin A1c


 10.1 %


(4.8-5.6) 


 


 





 


Calcium Level


 9.1 mg/dL


(8.5-10.1) 


 


 





 


Magnesium Level


 1.6 mg/dL


(1.8-2.4) 


 


 





 


Total Bilirubin


 1.2 mg/dL


(0.2-1.0) 


 


 





 


Aspartate Amino Transf


(AST/SGOT) 21 U/L (15-37) 


 


 


 





 


Alanine Aminotransferase


(ALT/SGPT) 46 U/L (16-63) 


 


 


 





 


Alkaline Phosphatase


 117 U/L


() 


 


 





 


Troponin I Quantitative


 < 0.017 ng/mL


(0.000-0.055) 


 


 





 


NT-Pro-B-Type Natriuretic


Peptide 36 pg/mL


(0-124) 


 


 





 


Total Protein


 7.4 g/dL


(6.4-8.2) 


 


 





 


Albumin


 3.9 g/dL


(3.4-5.0) 


 


 





 


Albumin/Globulin Ratio 1.1 (1.0-1.7)    


 


Thyroid Stimulating Hormone


(TSH) 2.539 uIU/mL


(0.358-3.74) 


 


 





 


Bedside Troponin I


 


 0.00 ng/ml


(<0.08) 0.23 ng/ml


(<0.08) 





 


Glucose (Fingerstick)


 


 


 


 344 mg/dL


(70-99)


 


Test


 8/15/19


13:35 8/15/19


15:00 8/15/19


16:20 8/15/19


17:13


 


Troponin I Quantitative


 9.492 ng/mL


(0.000-0.055) 


 29.259 ng/mL


(0.000-0.055) 





 


Urine Collection Type  Unknown   


 


Urine Color  Yellow   


 


Urine Clarity  Clear   


 


Urine pH  6.0   


 


Urine Specific Gravity  >=1.030   


 


Urine Protein


 


 Negative mg/dL


(NEG-TRACE) 


 





 


Urine Glucose (UA)


 


 >=1000 mg/dL


(NEG) 


 





 


Urine Ketones (Stick)  15 mg/dL (NEG)   


 


Urine Blood  Negative (NEG)   


 


Urine Nitrite  Negative (NEG)   


 


Urine Bilirubin  Negative (NEG)   


 


Urine Urobilinogen Dipstick


 


 0.2 mg/dL (0.2


mg/dL) 


 





 


Urine Leukocyte Esterase  Negative (NEG)   


 


Urine RBC  0 /HPF (0-2)   


 


Urine WBC  0 /HPF (0-4)   


 


Urine Squamous Epithelial


Cells 


 Occ /LPF 


 


 





 


Urine Bacteria  0 /HPF (0-FEW)   


 


Glucose (Fingerstick)


 


 


 


 328 mg/dL


(70-99)


 


Test


 8/15/19


20:07 8/15/19


22:30 8/16/19


05:00 8/16/19


07:50


 


Glucose (Fingerstick)


 240 mg/dL


(70-99) 


 


 172 mg/dL


(70-99)


 


Heparin Anti-Xa Act,


Unfractionated 


 < 0.10 IU/mL


(0.30-0.70) 0.19 IU/mL


(0.30-0.70) 





 


White Blood Count


 


 


 7.8 x10^3/uL


(4.0-11.0) 





 


Red Blood Count


 


 


 4.59 x10^6/uL


(4.30-5.70) 





 


Hemoglobin


 


 


 14.1 g/dL


(13.0-17.5) 





 


Hematocrit


 


 


 40.5 %


(39.0-53.0) 





 


Mean Corpuscular Volume   88 fL ()  


 


Mean Corpuscular Hemoglobin   31 pg (25-35)  


 


Mean Corpuscular Hemoglobin


Concent 


 


 35 g/dL


(31-37) 





 


Red Cell Distribution Width


 


 


 13.9 %


(11.5-14.5) 





 


Platelet Count


 


 


 175 x10^3/uL


(140-400) 





 


Neutrophils (%) (Auto)   76 % (31-73)  


 


Lymphocytes (%) (Auto)   16 % (24-48)  


 


Monocytes (%) (Auto)   7 % (0-9)  


 


Eosinophils (%) (Auto)   1 % (0-3)  


 


Basophils (%) (Auto)   0 % (0-3)  


 


Neutrophils # (Auto)


 


 


 5.9 x10^3/uL


(1.8-7.7) 





 


Lymphocytes # (Auto)


 


 


 1.2 x10^3/uL


(1.0-4.8) 





 


Monocytes # (Auto)


 


 


 0.5 x10^3/uL


(0.0-1.1) 





 


Eosinophils # (Auto)


 


 


 0.1 x10^3/uL


(0.0-0.7) 





 


Basophils # (Auto)


 


 


 0.0 x10^3/uL


(0.0-0.2) 





 


Sodium Level


 


 


 143 mmol/L


(136-145) 





 


Potassium Level


 


 


 3.1 mmol/L


(3.5-5.1) 





 


Chloride Level


 


 


 106 mmol/L


() 





 


Carbon Dioxide Level


 


 


 26 mmol/L


(21-32) 





 


Anion Gap   11 (6-14)  


 


Blood Urea Nitrogen


 


 


 16 mg/dL


(8-26) 





 


Creatinine


 


 


 1.1 mg/dL


(0.7-1.3) 





 


Estimated GFR


(Cockcroft-Gault) 


 


 69.8 


 





 


Glucose Level


 


 


 175 mg/dL


(70-99) 





 


Calcium Level


 


 


 8.4 mg/dL


(8.5-10.1) 





 


Magnesium Level


 


 


 1.9 mg/dL


(1.8-2.4) 





 


Triglycerides Level


 


 


 189 mg/dL


(0-150) 





 


Cholesterol Level


 


 


 165 mg/dL


(0-200) 





 


LDL Cholesterol, Calculated


 


 


 95 mg/dL


(0-100) 





 


VLDL Cholesterol, Calculated


 


 


 38 mg/dL


(0-40) 





 


Non-HDL Cholesterol Calculated


 


 


 133 mg/dL


(0-129) 





 


HDL Cholesterol


 


 


 32 mg/dL


(40-60) 





 


Cholesterol/HDL Ratio   5.2  








Laboratory Tests








Test


 8/15/19


12:27 8/15/19


13:35 8/15/19


15:00 8/15/19


16:20


 


Glucose (Fingerstick)


 344 mg/dL


(70-99) 


 


 





 


Troponin I Quantitative


 


 9.492 ng/mL


(0.000-0.055) 


 29.259 ng/mL


(0.000-0.055)


 


Urine Collection Type   Unknown  


 


Urine Color   Yellow  


 


Urine Clarity   Clear  


 


Urine pH   6.0  


 


Urine Specific Gravity   >=1.030  


 


Urine Protein


 


 


 Negative mg/dL


(NEG-TRACE) 





 


Urine Glucose (UA)


 


 


 >=1000 mg/dL


(NEG) 





 


Urine Ketones (Stick)   15 mg/dL (NEG)  


 


Urine Blood   Negative (NEG)  


 


Urine Nitrite   Negative (NEG)  


 


Urine Bilirubin   Negative (NEG)  


 


Urine Urobilinogen Dipstick


 


 


 0.2 mg/dL (0.2


mg/dL) 





 


Urine Leukocyte Esterase   Negative (NEG)  


 


Urine RBC   0 /HPF (0-2)  


 


Urine WBC   0 /HPF (0-4)  


 


Urine Squamous Epithelial


Cells 


 


 Occ /LPF 


 





 


Urine Bacteria   0 /HPF (0-FEW)  


 


Test


 8/15/19


17:13 8/15/19


20:07 8/15/19


22:30 8/16/19


05:00


 


Glucose (Fingerstick)


 328 mg/dL


(70-99) 240 mg/dL


(70-99) 


 





 


Heparin Anti-Xa Act,


Unfractionated 


 


 < 0.10 IU/mL


(0.30-0.70) 0.19 IU/mL


(0.30-0.70)


 


White Blood Count


 


 


 


 7.8 x10^3/uL


(4.0-11.0)


 


Red Blood Count


 


 


 


 4.59 x10^6/uL


(4.30-5.70)


 


Hemoglobin


 


 


 


 14.1 g/dL


(13.0-17.5)


 


Hematocrit


 


 


 


 40.5 %


(39.0-53.0)


 


Mean Corpuscular Volume    88 fL () 


 


Mean Corpuscular Hemoglobin    31 pg (25-35) 


 


Mean Corpuscular Hemoglobin


Concent 


 


 


 35 g/dL


(31-37)


 


Red Cell Distribution Width


 


 


 


 13.9 %


(11.5-14.5)


 


Platelet Count


 


 


 


 175 x10^3/uL


(140-400)


 


Neutrophils (%) (Auto)    76 % (31-73) 


 


Lymphocytes (%) (Auto)    16 % (24-48) 


 


Monocytes (%) (Auto)    7 % (0-9) 


 


Eosinophils (%) (Auto)    1 % (0-3) 


 


Basophils (%) (Auto)    0 % (0-3) 


 


Neutrophils # (Auto)


 


 


 


 5.9 x10^3/uL


(1.8-7.7)


 


Lymphocytes # (Auto)


 


 


 


 1.2 x10^3/uL


(1.0-4.8)


 


Monocytes # (Auto)


 


 


 


 0.5 x10^3/uL


(0.0-1.1)


 


Eosinophils # (Auto)


 


 


 


 0.1 x10^3/uL


(0.0-0.7)


 


Basophils # (Auto)


 


 


 


 0.0 x10^3/uL


(0.0-0.2)


 


Sodium Level


 


 


 


 143 mmol/L


(136-145)


 


Potassium Level


 


 


 


 3.1 mmol/L


(3.5-5.1)


 


Chloride Level


 


 


 


 106 mmol/L


()


 


Carbon Dioxide Level


 


 


 


 26 mmol/L


(21-32)


 


Anion Gap    11 (6-14) 


 


Blood Urea Nitrogen


 


 


 


 16 mg/dL


(8-26)


 


Creatinine


 


 


 


 1.1 mg/dL


(0.7-1.3)


 


Estimated GFR


(Cockcroft-Gault) 


 


 


 69.8 





 


Glucose Level


 


 


 


 175 mg/dL


(70-99)


 


Calcium Level


 


 


 


 8.4 mg/dL


(8.5-10.1)


 


Magnesium Level


 


 


 


 1.9 mg/dL


(1.8-2.4)


 


Triglycerides Level


 


 


 


 189 mg/dL


(0-150)


 


Cholesterol Level


 


 


 


 165 mg/dL


(0-200)


 


LDL Cholesterol, Calculated


 


 


 


 95 mg/dL


(0-100)


 


VLDL Cholesterol, Calculated


 


 


 


 38 mg/dL


(0-40)


 


Non-HDL Cholesterol Calculated


 


 


 


 133 mg/dL


(0-129)


 


HDL Cholesterol


 


 


 


 32 mg/dL


(40-60)


 


Cholesterol/HDL Ratio    5.2 


 


Test


 8/16/19


07:50 


 


 





 


Glucose (Fingerstick)


 172 mg/dL


(70-99) 


 


 











Medications





Current Medications


Metoprolol Tartrate (Lopressor Vial) 5 mg 1X  ONCE IVP  Last administered on 

8/15/19at 09:46;  Start 8/15/19 at 09:30;  Stop 8/15/19 at 09:32;  Status DC


Heparin Sodium (Porcine) (Heparin Sodium) 4,000 unit 1X  ONCE IV  Last 

administered on 8/15/19at 09:49;  Start 8/15/19 at 09:45;  Stop 8/15/19 at 

09:55;  Status DC


Heparin Sodium/ Dextrose 0 ml @ As Directed STK-MED ONCE IV ;  Start 8/15/19 at 

09:45;  Stop 8/15/19 at 09:45;  Status DC


Lorazepam (Ativan Inj) 2 mg STK-MED ONCE .ROUTE ;  Start 8/15/19 at 09:45;  Stop

8/15/19 at 09:45;  Status DC


Nitroglycerin/ Dextrose 250 ml @  As Directed STK-MED ONCE IV ;  Start 8/15/19 

at 09:51;  Stop 8/15/19 at 09:51;  Status DC


Sodium Chloride 1,000 ml @  1,000 mls/hr 1X  ONCE IV  Last administered on 

8/15/19at 10:34;  Start 8/15/19 at 10:15;  Stop 8/15/19 at 11:14;  Status DC


Insulin Human Regular (HumuLIN R VIAL) 10 unit 1X  ONCE IV  Last administered on

8/15/19at 10:34;  Start 8/15/19 at 10:15;  Stop 8/15/19 at 10:18;  Status DC


Magnesium Sulfate/ Dextrose 100 ml @  100 mls/hr 1X  ONCE IV  Last administered 

on 8/15/19at 10:34;  Start 8/15/19 at 10:15;  Stop 8/15/19 at 11:14;  Status DC


Insulin Human Lispro (HumaLOG) 10 units 1X  ONCE SQ  Last administered on 8/15

/19at 14:30;  Start 8/15/19 at 13:45;  Stop 8/15/19 at 13:49;  Status DC


Insulin Human Lispro (HumaLOG) 0-9 UNITS TIDWMEALS SQ  Last administered on 

8/15/19at 17:34;  Start 8/15/19 at 14:25


Dextrose (Dextrose 50%-Water Syringe) 12.5 gm PRN Q15MIN  PRN IV SEE COMMENTS;  

Start 8/15/19 at 13:45;  Status UNV


Dextrose 250 ml PRN Q15MIN  PRN IV SEE COMMENTS;  Start 8/15/19 at 13:45


Heparin Sodium/ Dextrose 500 ml @  20 mls/hr CONT  PRN IV chest pain Last 

administered on 8/15/19at 15:08;  Start 8/15/19 at 14:30


Heparin Sodium (Porcine) (Heparin Sodium) 2,700 unit PRN Q6HRS  PRN IV FOR UFH 

LEVEL LESS THAN 0.2 Last administered on 8/16/19at 06:16;  Start 8/15/19 at 

14:30


Labetalol HCl (Normodyne Iv Push) 20 mg PRN Q2HR  PRN IVP HYPERTENSION Last 

administered on 8/15/19at 15:32;  Start 8/15/19 at 14:30


Amlodipine Besylate (Norvasc) 5 mg 1X  ONCE PO  Last administered on 8/15/19at 

15:32;  Start 8/15/19 at 14:30;  Stop 8/15/19 at 14:55;  Status DC


Tirofiban/Sodium Chloride 250 ml @ 0 mls/hr CONT  PRN IV PER PROTOCOL;  Start 

8/15/19 at 14:45


Amlodipine Besylate (Norvasc) 10 mg DAILY PO ;  Start 8/16/19 at 09:00


Aspirin (Children'S Aspirin) 81 mg DAILY PO  Last administered on 8/16/19at 

10:14;  Start 8/15/19 at 18:30


Atorvastatin Calcium (Lipitor) 40 mg QHS PO  Last administered on 8/15/19at 

20:20;  Start 8/15/19 at 21:00


Insulin Glargine (Lantus) 108 units QHS SQ  Last administered on 8/15/19at 20:

20;  Start 8/15/19 at 21:00


Carvedilol (Coreg) 50 mg BIDWMEALS PO  Last administered on 8/16/19at 10:14;  

Start 8/15/19 at 18:30


Insulin Human Lispro (HumaLOG) 36 units DAILYWLUN SQ ;  Start 8/16/19 at 12:00


Insulin Human Lispro (HumaLOG) 38 units DAILYWBKFT SQ ;  Start 8/16/19 at 08:00


Insulin Human Lispro (HumaLOG) 42 units DAILYWSUP SQ  Last administered on 

8/15/19at 18:34;  Start 8/15/19 at 18:30


Losartan Potassium (Cozaar) 100 mg DAILY PO ;  Start 8/16/19 at 09:00


Bismuth Subsalicylate (Pepto-Bismol) 262 mg PRN Q1HR  PRN PO DIARRHEA Last 

administered on 8/15/19at 22:33;  Start 8/15/19 at 22:15


Iodixanol (Visipaque 320) 100 ml STK-MED ONCE .ROUTE ;  Start 8/16/19 at 07:34; 

Stop 8/16/19 at 07:34;  Status DC


Lidocaine HCl (Lidocaine 1% 20ml Vial) 20 ml STK-MED ONCE .ROUTE ;  Start 

8/16/19 at 07:34;  Stop 8/16/19 at 07:34;  Status DC


Heparin Sodium/ Sodium Chloride 1,500 ml @  As Directed STK-MED ONCE .ROUTE ;  

Start 8/16/19 at 07:34;  Stop 8/16/19 at 07:35;  Status DC


Potassium Chloride (Klor-Con) 40 meq 1X  ONCE PO ;  Start 8/16/19 at 10:00;  

Stop 8/16/19 at 10:01;  Status DC


Acetaminophen (Tylenol) 500 mg PRN Q6HRS  PRN PO MILD PAIN / TEMP;  Start 

8/16/19 at 09:15


Acetaminophen/ Codeine Phosphate (Tylenol #3) 1 tab PRN Q6HRS  PRN PO MODERATE 

PAIN;  Start 8/16/19 at 09:15


Ondansetron HCl (Zofran) 4 mg PRN Q6HRS  PRN IV NAUSEA/VOMITING;  Start 8/16/19 

at 09:15





Active Scripts


Active


Reported


Carvedilol 25 Mg Tablet 50 Mg PO BIDWMEALS


Atorvastatin Calcium 40 Mg Tablet 1 Tab PO DAILY


Aspirin 81 Mg Tab.chew 1 Tab PO DAILY


Amlodipine Besylate 10 Mg Tablet 10 Mg PO DAILY


Metformin HCl 500 Mg/5 Ml Solution 1,000 Mg PO BID


Losartan Potassium 100 Mg Tablet 100 Mg PO DAILY


Lantus Solostar (Insulin Glargine,Hum.rec.anlog) 100 Unit/1 Ml Insuln.pen 108 

Unit SQ QHS


Novolog (Insulin Aspart) 100 Unit/1 Ml Cartridge 42 Unit SQ DAILYWSUP


Novolog (Insulin Aspart) 100 Unit/1 Ml Cartridge 36 Unit SQ DAILYWLUN


Novolog (Insulin Aspart) 100 Unit/1 Ml Cartridge 38 Unit SQ DAILYWBKFT


Vitals/I & O





Vital Sign - Last 24 Hours








 8/15/19 8/15/19 8/15/19 8/15/19





 12:20 12:30 15:00 15:32


 


Temp 98.6  98.4 





 98.6  98.4 


 


Pulse 101  96 105


 


Resp 18  18 


 


B/P (MAP) 186/103 (130)  190/113 (138) 190/113


 


Pulse Ox 96  98 


 


O2 Delivery Room Air Room Air Room Air 


 


    





    





 8/15/19 8/15/19 8/15/19 8/15/19





 15:32 18:34 19:00 20:00


 


Temp   98.7 





   98.7 


 


Pulse 95 105 85 


 


Resp   18 


 


B/P (MAP) 190/113 190/113 135/86 (102) 


 


Pulse Ox   97 


 


O2 Delivery   Room Air Room Air


 


    





    





 8/15/19 8/16/19 8/16/19 8/16/19





 23:39 03:10 07:00 10:14


 


Temp 98.7 99.0 98.3 





 98.7 99.0 98.3 


 


Pulse 82 72 70 68


 


Resp 16 16 20 


 


B/P (MAP) 118/63 (81) 122/79 (93) 115/69 (84) 126/69


 


Pulse Ox 96 94 96 


 


O2 Delivery Room Air Room Air Room Air 














Intake and Output   


 


 8/15/19 8/15/19 8/16/19





 15:00 23:00 07:00


 


Intake Total 1104.5 ml  480 ml


 


Output Total  300 ml 


 


Balance 1104.5 ml -300 ml 480 ml

















CLIFF LANDIS MD           Aug 16, 2019 11:35

## 2019-08-16 NOTE — CARD
MR#: [id number]

Account#: [id number]

Accession#: [id number]

Date of Study: 08/16/2019

Ordering Physician: JOSESITO PARHAM, 

Referring Physician: JOSESITO PARHAM, 

Tech: 





--------------- APPROVED REPORT --------------





EXAM: Two-dimensional and M-mode echocardiogram with Doppler and color Doppler.



INDICATION

Cardiac Disease: CAD 

Chest Pain 

Non STEMI



RISK FACTORS

Hypertension 



2D DIMENSIONS 

Left Atrium(2D)3.9 (1.6-4.0cm)IVSd1.5 (0.7-1.1cm)

Aortic Root(2D)3.6 (2.0-3.7cm)LVDd5.4 (3.9-5.9cm)

LVOT Diameter2.4 (1.8-2.4cm)PWd0.8 (0.7-1.1cm)

LVDs3.5 (2.5-4.0cm)FS (%) 35.2 %

SV90.3 ml



Aortic Valve

AoV Peak Kirit.99.2cm/sAoV VTI18.9cm

AO Peak GR.3.9mmHgLVOT  VTI 14.31cm

AO Mean GR.3mmHg



Mitral Valve

MV E Zvirchao62.3cm/sMV DECEL [id number]

MV A Fonstghu51.9cm/sE/A  Ratio1.2



TDI

Lateral E' P. V6.76cm/sMedial E' P. V6.05cm/s

E/Lateral E'9.4E/Medial E'10.5



Tricuspid Valve

TR P. Xnptfala665ya/sRAP DYPIPVAL64jpLr

TR Peak Gr.44wuHuTKRW15tpYc



Pulmonary Vein

S1 Yffifete29.2cm/sS2 Inisxzss45.23cm/s

D2 Gsdjwzae18.2cm/sPVa heyatmum466rwdj



 LEFT VENTRICLE 

The left ventricle is normal size. There is mild to moderate concentric left ventricular hypertrophy.
 The left ventricular systolic function is normal and the ejection fraction is within normal range. T
he Ejection Fraction is 50-55%. There is normal LV segmental wall motion. Transmitral Doppler flow pa
ttern is Grade II-pseudonormal filling dynamics.



 RIGHT VENTRICLE 

The right ventricle is mildly dilated. There is normal right ventricular wall thickness. The right ve
ntricular systolic function is normal.



 ATRIA 

The left atrium size is normal. The right atrium size is normal. The interatrial septum is intact wit
h no evidence for an atrial septal defect or patent foramen ovale as noted on 2-D or Doppler imaging.




 AORTIC VALVE 

The aortic valve is normal in structure and function. Doppler and Color Flow revealed no significant 
aortic regurgitation. There is no significant aortic valvular stenosis.



 MITRAL VALVE 

The mitral valve is normal in structure and function. There is no evidence of mitral valve prolapse. 
There is no mitral valve stenosis. Doppler and Color-flow revealed trace mitral regurgitation.



 TRICUSPID VALVE 

The tricuspid valve is normal in structure and function. Doppler and Color Flow revealed trace tricus
pid regurgitation with an estimated PAP of 36 mmHg. There is no tricuspid valve stenosis.



 PULMONIC VALVE 

The pulmonic valve is not well visualized. Doppler and Color Flow revealed no pulmonic valvular regur
gitation.



 GREAT VESSELS 

The aortic root is normal in size. The IVC is dilated and collapses <50% with inspiration.



 PERICARDIAL EFFUSION 

There is no evidence of significant pericardial effusion.



Critical Notification

Critical Value: No



<Conclusion>

The left ventricle is normal size.

The left ventricular systolic function is normal and the ejection fraction is within normal range.

The Ejection Fraction is 50-55%.

There is mild to moderate concentric left ventricular hypertrophy.

There is no significant aortic valvular stenosis.

Doppler and Color Flow revealed no significant aortic regurgitation.

Doppler and Color-flow revealed trace mitral regurgitation.

Doppler and Color Flow revealed trace tricuspid regurgitation with an estimated PAP of 36 mmHg.



Signed by : Noé Thomas MD

Electronically Approved : 08/16/2019 14:33:22

## 2019-08-16 NOTE — CARD
MR#: [id number]

Account#: [id number]

Accession#: [id number]

Date of Study: 08/16/2019

Ordering Physician: SHIRA SAGE, 

Referring Physician: SHIRA SAGE 

Tech: POLA LEY RTR





--------------- APPROVED REPORT --------------





Technologist: POLA LEY RTR

Nurse: VIVIANA GLEZ RN



Procedure(s) performed: Fluoro time: 18.5min

Dose:[id number]

1.  Left heart catheterization, selective coronary angiography via right transradial approach

2.  Successful PCI/drug eluting stents placement to the right coronary artery and left anterior desce
nding artery

Contrast:191cc

Moderate sedation: 65MIN



INDICATION

The indication(s) include : non-STEMI .



CSHA Clinical Frailty Scale

CSHA Clinical Frailty Scale: Very Fit



Heart Failure

Heart Failure: No



PROCEDURE NARRATIVE

After explaining the risks, benefits and alternative options, informed consent was obtained from jyothi
ent.  Patient was brought to the cardiac Cath Lab and right wrist was prepped and draped in the usual
 fashion after confirming a positive modified Hari's test.  Arterial access was obtained in the righ
t radial artery and a 6 [place] sheath was inserted.  6 [place] Taco catheter was used to perform dot
ective angiography of the left and right coronary arteries. LVEDP and transaortic gradients remeasure
d. Left ventriculography was not performed due to contrast load used for intervention. The following 
findings were noted.



FINDINGS

1.  Hemodynamics: Left ventricular end-diastolic pressure of 29 mmHg.  No pullback gradient across th
e aortic valve.

2.  Coronary angiography:

a.  The left main coronary artery arose from the left sinus of Valsalva, gave rise to the left anteri
or descending and left circumflex arteries and did not show any significant stenosis.

b.  The left anterior descending artery showed 90% in-stent restenosis in the proximal to midsegment 
and 30% in-stent restenosis in the mid to distal segment.

c.  The left circumflex artery was a large caliber vessel that did not show any significant stenosis.


d.  The right coronary artery was a dominant vessel arising from the right sinus of Valsalva that eliane
wed 95% stenosis involving the proximal to midsegment. The very distal segments of the posterior desc
ending and posterolateral branches were occluded probably from thrombus. These are small caliber vess
els.



INTERVENTION

The right coronary artery was engaged with a 6 [place] JR4 guide catheter and the stenosis in the prox
imal to midsegment was crossed with a 0.014 inch Asahi Pro water guidewire. This was predilated with 
a 3.0 x 15 mm trek balloon following which this was successfully treated with a 3.25 x 33 mm Xience A
lpine drug-eluting stent. Follow-up angiography showed resolution of the stenosis to 0% with REYMUNDO 2-3
 distal flow. Subsequently, the left main coronary artery was engaged with 6 [place] XB 3.5 guide cath
eter and the in-stent restenosis in the proximal to midsegment of the left anterior descending artery
 was crossed with the same Asahi Pro water guidewire. This was predilated with the 3.5 x 12 mm trek b
alloon following which this was successfully treated with a 3.5 x 18 mm Xience Gabbi drug-eluting st
ent. Follow-up angiography showed resolution of the stenosis to 0% with REYMUNDO-3 distal flow. Patient t
olerated the procedure well. Hemostasis was achieved using TR band. There were no immediate complicat
ions.



REYMUNDO Flow

REYMUNDO Flow (Pre-Intervention): REYMUNDO-2     

REYMUNDO Flow (Post-Intervention): REYMUNDO-3



Conclusion

1.  Severe two-vessel coronary artery disease involving the right coronary and left anterior descendi
ng arteries as described above

2.  Acute on chronic diastolic heart failure as evidenced by elevated left ventricular end-diastolic 
pressure

3.  Successful PCI/drug eluting stents placement to the right coronary artery and also the left anter
ior descending artery



Recommendations

1.  Aspirin 325 mg daily

2.  Effient 10 mg daily for preferably one year

3.  Aggrastat infusion for 12 hours secondary to thrombotic occlusion of the very distal segments of 
small caliber PDA/PLB

4.  Cardiovascular risk factor modification



Signed by : Shira Sage, 

Electronically Approved : 08/16/2019 14:57:39

## 2019-08-16 NOTE — PDOC
MODERATE SEDATION ASSESSMENT


RISKS/ALTERNATIVES


Risks/Alternatives


Risks and alternatives of this type of sedation and procedure discussed with:


RISK/ALTERNATIVES:  Patient





H & P ON CHART


H & P


H & P on chart and reviewed for co-morbid conditions and appropriate labs.


H&P ON CHART:  Yes





PREGNANCY STATUS


PREG STATUS ASSESSED:  N/A





MEDS/ALLERGIES REVIEWED


Meds/Allergies Reviewed


Medications and Allergies including time and route of recently administered 

narcotics and sedatives.


MEDS/ALLERGIES REVIEWED:  Yes





ASA RATING


ASA RATING:  III





AIRWAY ASSESSMENT


Airway Assessment


Airway patency, oral function limitations, presence  of caps, crowns, dentures, 

partials, and ability to extend neck assessed.


AIRWAY ASSESSMENT:  Yes





MALLAMPATI SCORE


MALLAMPATI SCORE:  II





PRE-SEDATION ASSESSMENT


PRE-SEDATION ASSESSMENT:  Yes











SHIRA SAGE MD           Aug 16, 2019 14:44

## 2019-08-17 VITALS — SYSTOLIC BLOOD PRESSURE: 150 MMHG | DIASTOLIC BLOOD PRESSURE: 94 MMHG

## 2019-08-17 VITALS — SYSTOLIC BLOOD PRESSURE: 145 MMHG | DIASTOLIC BLOOD PRESSURE: 87 MMHG

## 2019-08-17 VITALS — DIASTOLIC BLOOD PRESSURE: 94 MMHG | SYSTOLIC BLOOD PRESSURE: 150 MMHG

## 2019-08-17 RX ADMIN — TIROFIBAN PRN MLS/HR: 5 INJECTION, SOLUTION INTRAVENOUS at 00:09

## 2019-08-17 RX ADMIN — LOSARTAN POTASSIUM SCH MG: 50 TABLET ORAL at 08:52

## 2019-08-17 RX ADMIN — INSULIN LISPRO SCH UNITS: 100 INJECTION, SOLUTION INTRAVENOUS; SUBCUTANEOUS at 08:00

## 2019-08-17 RX ADMIN — CARVEDILOL SCH MG: 12.5 TABLET, FILM COATED ORAL at 08:53

## 2019-08-17 NOTE — PDOC3
Discharge Summary


Visit Information


Date of Admission:  Aug 15, 2019


Date of Discharge:  Aug 17, 2019


Admitting Diagnosis Comment:


1.  Chest pain with mixed features.  POss NSTEMI - was on ASA at home only


2.  Accelerated HTN: 


3.  DM-2: jardiance


4.  HLP: statins


5. Obesity


6. Hypokalemia


Final Diagnosis


Problems


Medical Problems:


(1) CAD (coronary artery disease)


Status: Acute  





(2) Chest pain


Status: Acute  





(3) Hyperglycemia


Status: Acute  











Brief Hospital Course


Allergies





                                    Allergies








Coded Allergies Type Severity Reaction Last Updated Verified


 


  No Known Drug Allergies    8/15/19 No








Vital Signs





Vital Signs








  Date Time  Temp Pulse Resp B/P (MAP) Pulse Ox O2 Delivery O2 Flow Rate FiO2


 


8/17/19 08:53  74  150/94    


 


8/17/19 03:45 97.5  17  96 Room Air  





 97.5       


 


8/16/19 23:56       2.0 








Lab Results





Laboratory Tests








Test


 8/15/19


11:11 8/15/19


12:27 8/15/19


13:35 8/15/19


15:00


 


Bedside Troponin I


 0.23 ng/ml


(<0.08) 


 


 





 


Glucose (Fingerstick)


 


 344 mg/dL


(70-99) 


 





 


Troponin I Quantitative


 


 


 9.492 ng/mL


(0.000-0.055) 





 


Urine Collection Type    Unknown 


 


Urine Color    Yellow 


 


Urine Clarity    Clear 


 


Urine pH    6.0 


 


Urine Specific Gravity    >=1.030 


 


Urine Protein


 


 


 


 Negative mg/dL


(NEG-TRACE)


 


Urine Glucose (UA)


 


 


 


 >=1000 mg/dL


(NEG)


 


Urine Ketones (Stick)    15 mg/dL (NEG) 


 


Urine Blood    Negative (NEG) 


 


Urine Nitrite    Negative (NEG) 


 


Urine Bilirubin    Negative (NEG) 


 


Urine Urobilinogen Dipstick


 


 


 


 0.2 mg/dL (0.2


mg/dL)


 


Urine Leukocyte Esterase    Negative (NEG) 


 


Urine RBC    0 /HPF (0-2) 


 


Urine WBC    0 /HPF (0-4) 


 


Urine Squamous Epithelial


Cells 


 


 


 Occ /LPF 





 


Urine Bacteria    0 /HPF (0-FEW) 


 


Test


 8/15/19


16:20 8/15/19


17:13 8/15/19


20:07 8/15/19


22:30


 


Troponin I Quantitative


 29.259 ng/mL


(0.000-0.055) 


 


 





 


Glucose (Fingerstick)


 


 328 mg/dL


(70-99) 240 mg/dL


(70-99) 





 


Heparin Anti-Xa Act,


Unfractionated 


 


 


 < 0.10 IU/mL


(0.30-0.70)


 


Test


 8/16/19


05:00 8/16/19


07:50 8/16/19


10:50 8/16/19


12:10


 


White Blood Count


 7.8 x10^3/uL


(4.0-11.0) 


 


 





 


Red Blood Count


 4.59 x10^6/uL


(4.30-5.70) 


 


 





 


Hemoglobin


 14.1 g/dL


(13.0-17.5) 


 


 





 


Hematocrit


 40.5 %


(39.0-53.0) 


 


 





 


Mean Corpuscular Volume 88 fL ()    


 


Mean Corpuscular Hemoglobin 31 pg (25-35)    


 


Mean Corpuscular Hemoglobin


Concent 35 g/dL


(31-37) 


 


 





 


Red Cell Distribution Width


 13.9 %


(11.5-14.5) 


 


 





 


Platelet Count


 175 x10^3/uL


(140-400) 


 


 





 


Neutrophils (%) (Auto) 76 % (31-73)    


 


Lymphocytes (%) (Auto) 16 % (24-48)    


 


Monocytes (%) (Auto) 7 % (0-9)    


 


Eosinophils (%) (Auto) 1 % (0-3)    


 


Basophils (%) (Auto) 0 % (0-3)    


 


Neutrophils # (Auto)


 5.9 x10^3/uL


(1.8-7.7) 


 


 





 


Lymphocytes # (Auto)


 1.2 x10^3/uL


(1.0-4.8) 


 


 





 


Monocytes # (Auto)


 0.5 x10^3/uL


(0.0-1.1) 


 


 





 


Eosinophils # (Auto)


 0.1 x10^3/uL


(0.0-0.7) 


 


 





 


Basophils # (Auto)


 0.0 x10^3/uL


(0.0-0.2) 


 


 





 


Heparin Anti-Xa Act,


Unfractionated 0.19 IU/mL


(0.30-0.70) 


 0.37 IU/mL


(0.30-0.70) 





 


Sodium Level


 143 mmol/L


(136-145) 


 


 





 


Potassium Level


 3.1 mmol/L


(3.5-5.1) 


 


 





 


Chloride Level


 106 mmol/L


() 


 


 





 


Carbon Dioxide Level


 26 mmol/L


(21-32) 


 


 





 


Anion Gap 11 (6-14)    


 


Blood Urea Nitrogen


 16 mg/dL


(8-26) 


 


 





 


Creatinine


 1.1 mg/dL


(0.7-1.3) 


 


 





 


Estimated GFR


(Cockcroft-Gault) 69.8 


 


 


 





 


Glucose Level


 175 mg/dL


(70-99) 


 


 





 


Calcium Level


 8.4 mg/dL


(8.5-10.1) 


 


 





 


Magnesium Level


 1.9 mg/dL


(1.8-2.4) 


 


 





 


Triglycerides Level


 189 mg/dL


(0-150) 


 


 





 


Cholesterol Level


 165 mg/dL


(0-200) 


 


 





 


LDL Cholesterol, Calculated


 95 mg/dL


(0-100) 


 


 





 


VLDL Cholesterol, Calculated


 38 mg/dL


(0-40) 


 


 





 


Non-HDL Cholesterol Calculated


 133 mg/dL


(0-129) 


 


 





 


HDL Cholesterol


 32 mg/dL


(40-60) 


 


 





 


Cholesterol/HDL Ratio 5.2    


 


Glucose (Fingerstick)


 


 172 mg/dL


(70-99) 


 173 mg/dL


(70-99)


 


Test


 8/16/19


16:41 8/16/19


21:30 8/17/19


08:31 





 


Glucose (Fingerstick)


 113 mg/dL


(70-99) 148 mg/dL


(70-99) 85 mg/dL


(70-99) 











Laboratory Tests








Test


 8/16/19


10:50 8/16/19


12:10 8/16/19


16:41 8/16/19


21:30


 


Heparin Anti-Xa Act,


Unfractionated 0.37 IU/mL


(0.30-0.70) 


 


 





 


Glucose (Fingerstick)


 


 173 mg/dL


(70-99) 113 mg/dL


(70-99) 148 mg/dL


(70-99)


 


Test


 8/17/19


08:31 


 


 





 


Glucose (Fingerstick)


 85 mg/dL


(70-99) 


 


 











Brief Hospital Course


Mr. John  is a 54 old obese male with 8 indwelling stents? [facility] 

[facility], [facility] for NSTEMi and now has 2 more new stents, RCA and LAD, NEeds ASA 

325 for life and effient x 1 yr preferably,


Rx in chart


COnt your hefty doses of insulin, losartan,norvasc, metformin etc, BB was inc 

though from 12,5 to 50 BID





Pt seen and examined, dw cards and RN





Discharge Information


Condition at Discharge:  Improved, Stable


Follow Up:  Weeks (cards as instructed)


Disposition/Orders:  D/C to Home


Scheduled


Amlodipine Besylate (Amlodipine Besylate) 10 Mg Tablet, 10 MG PO DAILY for HTN, 

(Reported)


   Entered as Reported by: SUSHIL MOHR on 8/15/19 1755


   Last Taken: Unknown Dose on Unknown Date & Time     Last Action: Continued on

8/15/19 1759 by SUSHIL MOHR


Aspirin (Aspirin Ec) 325 Mg Tablet.dr, 325 MG PO DAILYWBKFT for cad, #120


   Prescribed by: CLIFF LANDIS on 8/17/19 0929


Atorvastatin Calcium (Atorvastatin Calcium) 40 Mg Tablet, 1 TAB PO DAILY for 

high cholestrol , #30 Ref 5 (Reported)


   Entered as Reported by: SUSHIL MOHR on 8/15/19 1755


   Last Taken: Unknown Dose on Unknown Date & Time     Last Action: Continued on

8/15/19 1759 by SUSHIL MOHR


Carvedilol (Carvedilol **) 12.5 Mg Tablet, 50 MG PO BIDWMEALS for cad, #60 Ref 2


   Prescribed by: CLIFF LANDIS on 8/17/19 0929


Insulin Aspart (Novolog) 100 Unit/1 Ml Cartridge, 38 UNIT SQ DAILYWBKFT for 

diabetes, (Reported)


   Entered as Reported by: SUSHIL MOHR on 8/15/19 1755


   Last Taken: Unknown Dose on Unknown Date & Time     Last Action: Converted on

8/15/19 1759 by SUSHIL MOHR


Insulin Aspart (Novolog) 100 Unit/1 Ml Cartridge, 36 UNIT SQ DAILYWLUN for 

diabetes, (Reported)


   Entered as Reported by: SUSHIL MOHR on 8/15/19 1755


   Last Taken: Unknown Dose on Unknown Date & Time     Last Action: Converted on

8/15/19 1759 by SUSHIL MOHR


Insulin Aspart (Novolog) 100 Unit/1 Ml Cartridge, 42 UNIT SQ DAILYWSUP for 

diabetes, (Reported)


   Entered as Reported by: SUSHIL MOHR on 8/15/19 1755


   Last Taken: Unknown Dose on Unknown Date & Time     Last Action: Converted on

8/15/19 1759 by SUSHIL MOHR


Insulin Glargine,Hum.rec.anlog (Lantus Solostar) 100 Unit/1 Ml Insuln.pen, 108 

UNIT SQ QHS for diabetes, #15 Ref 3 (Reported)


   Entered as Reported by: SUSHIL MOHR on 8/15/19 1755


   Last Taken: Unknown Dose on Unknown Date & Time     Last Action: Continued on

8/15/19 1759 by SUSHIL MOHR


Losartan Potassium (Losartan Potassium) 100 Mg Tablet, 100 MG PO DAILY for 

HYPERTENSION, (Reported)


   Entered as Reported by: SUSHIL MOHR on 8/15/19 1755


   Last Taken: Unknown Dose on Unknown Date & Time     Last Action: Converted on

8/15/19 1759 by SUSHIL MOHR


Metformin HCl (Metformin HCl) 500 Mg/5 Ml Solution, 1,000 MG PO BID for d

iabetes, (Reported)


   Entered as Reported by: SUSHIL MOHR on 8/15/19 1755


   Last Taken: Unknown Dose on Unknown Date & Time     Last Action: Reviewed on 

8/15/19 1756 by SUSHIL MOHR


Prasugrel Hcl (Effient) 10 Mg Tablet, 10 MG PO DAILYWBKFT for cad, #90


   Prescribed by: CLIFF LANDIS on 8/17/19 0929





Discontinued Medications


Aspirin (Aspirin) 81 Mg Tab.chew, 1 TAB PO DAILY for heart health , #30 Ref 3 

(Reported)


   Entered as Reported by: SUSHIL MOHR on 8/15/19 1755


   Last Taken: Unknown Dose on Unknown Date & Time     Last Action: Continued on

8/15/19 1759 by SUSHIL MOHR


Carvedilol (Carvedilol) 25 Mg Tablet, 50 MG PO BIDWMEALS for CARDIAC, (Reported)


   Entered as Reported by: SUSHIL MOHR on 8/15/19 1755


   Last Taken: Unknown Dose on Unknown Date & Time     Last Action: Converted on

8/15/19 1759 by CLIFF PUGA MD           Aug 17, 2019 10:05

## 2019-08-17 NOTE — NUR
Discharge Note:



ROYA BOOGIE [place]



Discharge instructions and discharge home medications reviewed with Patient and a copy 
given. All questions have been answered and understanding verbalized. 



The following instructions and handouts were given: post cardiac cath instructions, coreg, 
effient, ASA.

## 2024-05-26 NOTE — RAD
CHEST AP ONLY

 

History: Chest pain

 

Comparison: None.

 

Findings:

Single view of the chest is submitted. 

There is no infiltrate, pneumothorax, or effusion. Pericardial cardiac 

silhouette is borderline enlarged.

 

Impression: 

 

1.  Pericardial cardiac silhouette is borderline enlarged.

 

Electronically signed by: Jeffrey Mcknight MD (8/15/2019 9:39 AM) 

[facility]
no chest pain and no edema.